# Patient Record
Sex: MALE | Race: WHITE | Employment: OTHER | URBAN - METROPOLITAN AREA
[De-identification: names, ages, dates, MRNs, and addresses within clinical notes are randomized per-mention and may not be internally consistent; named-entity substitution may affect disease eponyms.]

---

## 2018-03-26 ENCOUNTER — OFFICE VISIT (OUTPATIENT)
Dept: FAMILY MEDICINE | Facility: CLINIC | Age: 53
End: 2018-03-26
Payer: COMMERCIAL

## 2018-03-26 VITALS
OXYGEN SATURATION: 97 % | HEIGHT: 68 IN | DIASTOLIC BLOOD PRESSURE: 60 MMHG | BODY MASS INDEX: 32.19 KG/M2 | HEART RATE: 88 BPM | SYSTOLIC BLOOD PRESSURE: 110 MMHG | TEMPERATURE: 97.2 F | WEIGHT: 212.38 LBS | RESPIRATION RATE: 12 BRPM

## 2018-03-26 DIAGNOSIS — Z11.59 NEED FOR HEPATITIS C SCREENING TEST: ICD-10-CM

## 2018-03-26 DIAGNOSIS — N45.1 EPIDIDYMITIS: Primary | ICD-10-CM

## 2018-03-26 DIAGNOSIS — R61 GENERALIZED HYPERHIDROSIS: ICD-10-CM

## 2018-03-26 LAB — TSH SERPL DL<=0.005 MIU/L-ACNC: 2.33 MU/L (ref 0.4–4)

## 2018-03-26 PROCEDURE — 86803 HEPATITIS C AB TEST: CPT | Performed by: FAMILY MEDICINE

## 2018-03-26 PROCEDURE — 36415 COLL VENOUS BLD VENIPUNCTURE: CPT | Performed by: FAMILY MEDICINE

## 2018-03-26 PROCEDURE — 84443 ASSAY THYROID STIM HORMONE: CPT | Performed by: FAMILY MEDICINE

## 2018-03-26 PROCEDURE — 99213 OFFICE O/P EST LOW 20 MIN: CPT | Performed by: FAMILY MEDICINE

## 2018-03-26 RX ORDER — LEVOFLOXACIN 500 MG/1
500 TABLET, FILM COATED ORAL DAILY
Qty: 10 TABLET | Refills: 0 | Status: SHIPPED | OUTPATIENT
Start: 2018-03-26 | End: 2018-04-10

## 2018-03-26 ASSESSMENT — PAIN SCALES - GENERAL: PAINLEVEL: MILD PAIN (2)

## 2018-03-26 NOTE — NURSING NOTE
"Chief Complaint   Patient presents with     Testicular/scrotal Pain     left testicle x6w. No injury. Worse after sex. Notices more pain when working out.      Sweats     x6m he states he is overheated most of the time. He hasnt worn a coat in 2 mos and uses no blankets at night. He sweats alot and feels warm all the time.        Initial /60  Pulse 88  Temp 97.2  F (36.2  C) (Tympanic)  Resp 12  Ht 5' 7.5\" (1.715 m)  Wt 212 lb 6 oz (96.3 kg)  SpO2 97%  BMI 32.77 kg/m2 Estimated body mass index is 32.77 kg/(m^2) as calculated from the following:    Height as of this encounter: 5' 7.5\" (1.715 m).    Weight as of this encounter: 212 lb 6 oz (96.3 kg).  Medication Reconciliation: complete   Health Maintenance Due   Topic Date Due     HEPATITIS C SCREENING  06/23/1983     Moni Nuno, [facility]      "

## 2018-03-26 NOTE — PROGRESS NOTES
Subjective:  Vision is here today with complaints of 3 weeks pain in his left testicle.  He denies any injury.  States that he just is getting very uncomfortable and there may be some swelling around the testicle.  Is not noticed any mass.  No other complaints at this time other than he has had some sweats in relationship to this.    Objective:  Gentle exam: Patient does have some swelling in the epididymis on the left testicle.  Testicle may be slightly swollen there is no mass appreciated.  No evidence of inguinal hernia on the left but there is tenderness of the cord structures.  No evidence of mass in the right testicle no swelling in the epididymis no evidence of right inguinal hernia.    Assessment:  Epididymitis    Plan:  Levofloxacin 500 mg daily for 7 days.  Can take ibuprofen for the discomfort.  It has no improvement over the next few days she will contact me I did order a TSH segment of the fact he states he has been having some instability and sweats over the past month but it also is a associated with the epididymitis he has not had any night sweats no weight loss.       Yon Miller MD

## 2018-03-26 NOTE — LETTER
March 29, 2018      Raffaele Rocha  [address] [address] [place] [id number]        Dear ,    We are writing to inform you of your test results.    Your test results fall within the expected range(s) or remain unchanged from previous results.  Please continue with current treatment plan.    Resulted Orders   Hepatitis C Screen Reflex to HCV RNA Quant and Genotype   Result Value Ref Range    Hepatitis C Antibody Nonreactive NR^Nonreactive      Comment:      Assay performance characteristics have not been established for newborns,   infants, and children     TSH with free T4 reflex   Result Value Ref Range    TSH 2.33 0.40 - 4.00 mU/L       If you have any questions or concerns, please call the clinic at the number listed above.       Sincerely,        Yon Miller MD, MD

## 2018-03-26 NOTE — MR AVS SNAPSHOT
"              After Visit Summary   3/26/2018    Raffaele Rocha    MRN: [id number]           Patient Information     Date Of Birth          1965        Visit Information        Provider Department      3/26/2018 5:50 PM Yon Miller MD [facility]        Today's Diagnoses     Epididymitis    -  1    Need for hepatitis C screening test        Generalized hyperhidrosis           Follow-ups after your visit        Who to contact     If you have questions or need follow up information about today's clinic visit or your schedule please contact [facility] directly at [phone].  Normal or non-critical lab and imaging results will be communicated to you by Satori Brandshart, letter or phone within 4 business days after the clinic has received the results. If you do not hear from us within 7 days, please contact the clinic through Satori Brandshart or phone. If you have a critical or abnormal lab result, we will notify you by phone as soon as possible.  Submit refill requests through [employer] or call your pharmacy and they will forward the refill request to us. Please allow 3 business days for your refill to be completed.          Additional Information About Your Visit        MyChart Information     [facility] lets you send messages to your doctor, view your test results, renew your prescriptions, schedule appointments and more. To sign up, go to [email or web address] . Click on \"Log in\" on the left side of the screen, which will take you to the Welcome page. Then click on \"Sign up Now\" on the right side of the page.     You will be asked to enter the access code listed below, as well as some personal information. Please follow the directions to create your username and password.     Your access code is: [email or web address]  Expires: 2018  6:32 PM     Your access code will  in 90 days. If you need help or a new code, please call your [facility] or [phone].        Care EveryWhere ID     " "This is your Care EveryWhere ID. This could be used by other organizations to access your [facility] medical records  [id number]        Your Vitals Were     Pulse Temperature Respirations Height Pulse Oximetry BMI (Body Mass Index)    88 97.2  F (36.2  C) (Tympanic) 12 5' 7.5\" (1.715 m) 97% 32.77 kg/m2       Blood Pressure from Last 3 Encounters:   03/26/18 110/60   12/16/16 118/58   03/25/16 123/81    Weight from Last 3 Encounters:   03/26/18 212 lb 6 oz (96.3 kg)   12/16/16 209 lb 6.4 oz (95 kg)   03/25/16 200 lb (90.7 kg)              We Performed the Following     Hepatitis C Screen Reflex to HCV RNA Quant and Genotype     TSH with free T4 reflex          Today's Medication Changes          These changes are accurate as of 3/26/18  6:32 PM.  If you have any questions, ask your nurse or doctor.               Start taking these medicines.        Dose/Directions    levofloxacin 500 MG tablet   Commonly known as:  LEVAQUIN   Used for:  Epididymitis   Started by:  Yon Miller MD        Dose:  500 mg   Take 1 tablet (500 mg) by mouth daily   Quantity:  10 tablet   Refills:  0            Where to get your medicines      These medications [facility] to [facility] [place] [address] [facility]   [facility] [address]     Phone:  [phone]     levofloxacin 500 MG tablet                Primary Care Provider Office Phone # Fax #    Yon Miller [phone]       [address] [place] [address]   [facility] [address]        Equal Access to Services     [facility]: Hadii chica ku hadasho Soomaali, waaxda luqadaha, qaybta kaalmada adeegyabethany, haroldo idiangela powell. So [facility] [phone].    ATENCIÓN: Si habla español, tiene a selby disposición servicios gratuitos de asistencia lingüística. Llame al [phone].    We comply with applicable federal civil rights laws and [place] laws. We do not discriminate on the basis of race, color, national origin, age, " disability, sex, sexual orientation, or gender identity.            Thank you!     Thank you for choosing [facility]  for your care. Our goal is always to provide you with excellent care. Hearing back from our patients is one way we can continue to improve our services. Please take a few minutes to complete the written survey that you may receive in the mail after your visit with us. Thank you!             Your Updated Medication List - Protect others around you: Learn how to safely use, store and throw away your medicines at www.disposemymeds.org.          This list is accurate as of 3/26/18  6:32 PM.  Always use your most recent med list.                   Brand Name Dispense Instructions for use Diagnosis    levofloxacin 500 MG tablet    LEVAQUIN    10 tablet    Take 1 tablet (500 mg) by mouth daily    Epididymitis       order for DME      Resmed Airsense 10 auto cpap 5-10 cm, Wisp nasal cushion large

## 2018-03-27 LAB — HCV AB SERPL QL IA: NONREACTIVE

## 2018-04-10 DIAGNOSIS — N45.1 EPIDIDYMITIS: ICD-10-CM

## 2018-04-10 RX ORDER — LEVOFLOXACIN 500 MG/1
500 TABLET, FILM COATED ORAL DAILY
Qty: 10 TABLET | Refills: 0 | Status: SHIPPED | OUTPATIENT
Start: 2018-04-10 | End: 2018-06-19

## 2018-06-19 ENCOUNTER — OFFICE VISIT (OUTPATIENT)
Dept: FAMILY MEDICINE | Facility: CLINIC | Age: 53
End: 2018-06-19
Payer: COMMERCIAL

## 2018-06-19 VITALS
SYSTOLIC BLOOD PRESSURE: 128 MMHG | DIASTOLIC BLOOD PRESSURE: 80 MMHG | BODY MASS INDEX: 33.22 KG/M2 | TEMPERATURE: 96.8 F | OXYGEN SATURATION: 97 % | RESPIRATION RATE: 22 BRPM | WEIGHT: 215.3 LBS | HEART RATE: 80 BPM

## 2018-06-19 DIAGNOSIS — J30.89 ENVIRONMENTAL AND SEASONAL ALLERGIES: ICD-10-CM

## 2018-06-19 DIAGNOSIS — J20.9 ACUTE BRONCHITIS WITH SYMPTOMS > 10 DAYS: Primary | ICD-10-CM

## 2018-06-19 PROCEDURE — 99213 OFFICE O/P EST LOW 20 MIN: CPT | Performed by: NURSE PRACTITIONER

## 2018-06-19 RX ORDER — AZITHROMYCIN 250 MG/1
TABLET, FILM COATED ORAL
Qty: 6 TABLET | Refills: 0 | Status: SHIPPED | OUTPATIENT
Start: 2018-06-19 | End: 2020-05-14

## 2018-06-19 RX ORDER — MONTELUKAST SODIUM 10 MG/1
10 TABLET ORAL AT BEDTIME
Qty: 30 TABLET | Refills: 1 | Status: SHIPPED | OUTPATIENT
Start: 2018-06-19 | End: 2020-05-14

## 2018-06-19 RX ORDER — CODEINE PHOSPHATE AND GUAIFENESIN 10; 100 MG/5ML; MG/5ML
2 SOLUTION ORAL EVERY 4 HOURS PRN
Qty: 240 ML | Refills: 0 | Status: SHIPPED | OUTPATIENT
Start: 2018-06-19 | End: 2020-05-14

## 2018-06-19 RX ORDER — ALBUTEROL SULFATE 90 UG/1
2 AEROSOL, METERED RESPIRATORY (INHALATION) EVERY 4 HOURS PRN
Qty: 1 INHALER | Refills: 0 | Status: SHIPPED | OUTPATIENT
Start: 2018-06-19 | End: 2020-05-14

## 2018-06-19 NOTE — PROGRESS NOTES
SUBJECTIVE:   Raffaele Rocha is a 52 year old male who presents to clinic today for the following health issues:      Concern - cough  Onset: 2 weeks    Description:   Tickle cough    Intensity: moderate    Progression of Symptoms:  same    Accompanying Signs & Symptoms:  Hoarse voice    Previous history of similar problem:   none    Precipitating factors:   Worsened by: lying down    Alleviating factors:  Improved by: none    Therapies Tried and outcome: none    The patient is seen in clinic with a persistent spasmodic nonproductive cough.  Interferes with sleep, is only been getting an hour and 1/2-2 hours of sleep at night, then wakes up and has to sit up for a while.  Is unable to fall back asleep afterwards.  He denies sinus congestion or postnasal drainage.  He states in the past he was diagnosed with allergies to bluegrass, red oak trees, and dust.  He is not taking anything for allergies because he does not have any nasal congestion or watery eyes.  He denies history of asthma, he is a non-smoker. He is not running a fever.    Problem list and histories reviewed & adjusted, as indicated.  Additional history: as documented    BP Readings from Last 3 Encounters:   06/19/18 128/80   03/26/18 110/60   12/16/16 118/58    Wt Readings from Last 3 Encounters:   06/19/18 215 lb 4.8 oz (97.7 kg)   03/26/18 212 lb 6 oz (96.3 kg)   12/16/16 209 lb 6.4 oz (95 kg)                    Reviewed and updated as needed this visit by clinical staff       Reviewed and updated as needed this visit by Provider         ROS:  Constitutional, HEENT, cardiovascular, pulmonary, gi and gu systems are negative, except as otherwise noted.    OBJECTIVE:     /80  Pulse 80  Temp 96.8  F (36  C) (Temporal)  Resp 22  Wt 215 lb 4.8 oz (97.7 kg)  SpO2 97%  BMI 33.22 kg/m2  Body mass index is 33.22 kg/(m^2).   GENERAL: healthy, alert and no distress  EYES: Eyes grossly normal to inspection, PERRL and conjunctivae and sclerae  normal  HENT: ear canals and TM's normal, nose and mouth without ulcers or lesions  NECK: no adenopathy, no asymmetry, masses, or scars and thyroid normal to palpation  RESP: lungs clear to auscultation - no rales, rhonchi or wheezes  CV: regular rates and rhythm, normal S1 S2, no S3 or S4 and no murmur, click or rub        ASSESSMENT/PLAN:     Problem List Items Addressed This Visit        Medium    Environmental and seasonal allergies    Relevant Medications    albuterol (PROAIR HFA/PROVENTIL HFA/VENTOLIN HFA) 108 (90 Base) MCG/ACT Inhaler    montelukast (SINGULAIR) 10 MG tablet      Other Visit Diagnoses     Acute bronchitis with symptoms > 10 days    -  Primary    Relevant Medications    azithromycin (ZITHROMAX) 250 MG tablet    guaiFENesin-codeine (ROBITUSSIN AC) 100-10 MG/5ML SOLN solution    albuterol (PROAIR HFA/PROVENTIL HFA/VENTOLIN HFA) 108 (90 Base) MCG/ACT Inhaler           I suspect his persistent cough may be more allergy related, but will cover any potential bacterial component with a Z-Shiva, since it has been going on for greater than 2 weeks.  Will start Singulair 10 mg at bedtime.  Since it has an indication for asthma management as well as allergies, this might work well for his current symptoms  Albuterol inhaler 2 puffs every 4-6 hours as needed for spasmodic cough.  Robitussin with codeine for cough at bedtime  Follow-up in clinic if symptoms fail to resolve        ALEKSANDRA Brock [facility]

## 2018-06-19 NOTE — MR AVS SNAPSHOT
"              After Visit Summary   6/19/2018    Raffaele Rocha    MRN: [id number]           Patient Information     Date Of Birth          1965        Visit Information        Provider Department      6/19/2018 5:30 PM Giselle Covarrubias APRN CNP [facility]        Today's Diagnoses     Acute bronchitis with symptoms > 10 days    -  1    Environmental and seasonal allergies           Follow-ups after your visit        Your next 10 appointments already scheduled     Jun 19, 2018  5:30 PM CDT   SHORT with ALEKSANDRA Brock CNP   [facility]    [address] [place] [id number] [phone]              Who to contact     If you have questions or need follow up information about today's clinic visit or your schedule please contact [facility] directly at [phone].  Normal or non-critical lab and imaging results will be communicated to you by MyChart, letter or phone within 4 business days after the clinic has received the results. If you do not hear from us within 7 days, please contact the clinic through MyChart or phone. If you have a critical or abnormal lab result, we will notify you by phone as soon as possible.  Submit refill requests through [employer] or call your pharmacy and they will forward the refill request to us. Please allow 3 business days for your refill to be completed.          Additional Information About Your Visit        MyChart Information     E[employer]io lets you send messages to your doctor, view your test results, renew your prescriptions, schedule appointments and more. To sign up, go to [email or web address] . Click on \"Log in\" on the left side of the screen, which will take you to the Welcome page. Then click on \"Sign up Now\" on the right side of the page.     You will be asked to enter the access code listed below, as well as some personal information. Please follow the directions to create " your username and password.     Your access code is: [email or web address]  Expires: 2018  6:32 PM     Your access code will  in 90 days. If you need help or a new code, please call your [facility] or [phone].        Care EveryWhere ID     This is your Care EveryWhere ID. This could be used by other organizations to access your [facility] medical records  [id number]        Your Vitals Were     Pulse Temperature Respirations Pulse Oximetry BMI (Body Mass Index)       80 96.8  F (36  C) (Temporal) 22 97% 33.22 kg/m2        Blood Pressure from Last 3 Encounters:   18 128/80   18 110/60   16 118/58    Weight from Last 3 Encounters:   18 215 lb 4.8 oz (97.7 kg)   18 212 lb 6 oz (96.3 kg)   16 209 lb 6.4 oz (95 kg)              Today, you had the following     No orders found for display         Today's Medication Changes          These changes are accurate as of 18  5:25 PM.  If you have any questions, ask your nurse or doctor.               Start taking these medicines.        Dose/Directions    albuterol 108 (90 Base) MCG/ACT Inhaler   Commonly known as:  PROAIR HFA/PROVENTIL HFA/VENTOLIN HFA   Used for:  Acute bronchitis with symptoms > 10 days   Started by:  Giselle Covarrubias APRN CNP        Dose:  2 puff   Inhale 2 puffs into the lungs every 4 hours as needed for shortness of breath / dyspnea or wheezing   Quantity:  1 Inhaler   Refills:  0       azithromycin 250 MG tablet   Commonly known as:  ZITHROMAX   Used for:  Acute bronchitis with symptoms > 10 days   Started by:  Giselle Covarrubias APRN CNP        Two tablets first day, then one tablet daily for four days.   Quantity:  6 tablet   Refills:  0       guaiFENesin-codeine 100-10 MG/5ML Soln solution   Commonly known as:  ROBITUSSIN AC   Used for:  Acute bronchitis with symptoms > 10 days   Started by:  Giselle Covarrubias APRN CNP        Dose:  2 tsp.   Take 10 mLs by mouth every 4 hours as needed    Quantity:  240 mL   Refills:  0       montelukast 10 MG tablet   Commonly known as:  SINGULAIR   Used for:  Environmental and seasonal allergies   Started by:  Giselle Covarrubias APRN CNP        Dose:  10 mg   Take 1 tablet (10 mg) by mouth At Bedtime   Quantity:  30 tablet   Refills:  1            Where to get your medicines      These medications [facility] [place] [facility] [facility]   [address] [facility] [address]     Phone:  [phone]     azithromycin 250 MG tablet    montelukast 10 MG tablet         Some of these will need a paper prescription and others can be bought over the counter.  Ask your nurse if you have questions.     Bring a paper prescription for each of these medications     albuterol 108 (90 Base) MCG/ACT Inhaler    guaiFENesin-codeine 100-10 MG/5ML Soln solution                Primary Care Provider Office Phone # Fax #    Yon Miller [phone] [address] [facility] [address] [facility]   [facility] [address]        Equal Access to Services     [facility]: Hadii aad ku hadasho Soomaali, waaxda luqadaha, qaybta kaalmada adeegyada, waxay idiin haythaddeusn andrzej lou . So [facility] [phone].    ATENCIÓN: Si habla español, tiene a selby disposición servicios gratuitos de asistencia lingüística. [facility] [phone].    We comply with applicable federal civil rights laws and [place] laws. We do not discriminate on the basis of race, color, national origin, age, disability, sex, sexual orientation, or gender identity.            Thank you!     Thank you for choosing [facility]  for your care. Our goal is always to provide you with excellent care. Hearing back from our patients is one way we can continue to improve our services. Please take a few minutes to complete the written survey that you may receive in the mail after your visit with us. Thank you!             Your Updated Medication List - Protect others around you: Learn  how to safely use, store and throw away your medicines at www.disposemymeds.org.          This list is accurate as of 6/19/18  5:25 PM.  Always use your most recent med list.                   Brand Name Dispense Instructions for use Diagnosis    albuterol 108 (90 Base) MCG/ACT Inhaler    PROAIR HFA/PROVENTIL HFA/VENTOLIN HFA    1 Inhaler    Inhale 2 puffs into the lungs every 4 hours as needed for shortness of breath / dyspnea or wheezing    Acute bronchitis with symptoms > 10 days       azithromycin 250 MG tablet    ZITHROMAX    6 tablet    Two tablets first day, then one tablet daily for four days.    Acute bronchitis with symptoms > 10 days       guaiFENesin-codeine 100-10 MG/5ML Soln solution    ROBITUSSIN AC    240 mL    Take 10 mLs by mouth every 4 hours as needed    Acute bronchitis with symptoms > 10 days       montelukast 10 MG tablet    SINGULAIR    30 tablet    Take 1 tablet (10 mg) by mouth At Bedtime    Environmental and seasonal allergies

## 2020-05-14 ENCOUNTER — VIRTUAL VISIT (OUTPATIENT)
Dept: INTERNAL MEDICINE | Facility: CLINIC | Age: 55
End: 2020-05-14
Payer: COMMERCIAL

## 2020-05-14 DIAGNOSIS — N35.919 STRICTURE OF MALE URETHRA, UNSPECIFIED STRICTURE TYPE: Primary | ICD-10-CM

## 2020-05-14 PROCEDURE — 99213 OFFICE O/P EST LOW 20 MIN: CPT | Mod: GT | Performed by: INTERNAL MEDICINE

## 2020-05-14 NOTE — PROGRESS NOTES
"Raffaele Rocha is a 54 year old male who is being evaluated via a billable video visit.      The patient has been notified of following:     \"This video visit will be conducted via a call between you and your physician/provider. We have found that certain health care needs can be provided without the need for an in-person physical exam.  This service lets us provide the care you need with a video conversation.  If a prescription is necessary we can send it directly to your pharmacy.  If lab work is needed we can place an order for that and you can then stop by our lab to have the test done at a later time.    Video visits are billed at different rates depending on your insurance coverage.  Please reach out to your insurance provider with any questions.    If during the course of the call the physician/provider feels a video visit is not appropriate, you will not be charged for this service.\"    Patient has given verbal consent for Video visit? Yes    How would you like to obtain your AVS?     Patient would like the video invitation sent by: Text to cell phone: [phone]    Will anyone else be joining your video visit? No    Subjective     Raffaele Rocha is a 54 year old male who presents today via video visit for the following health issues:    HPI  Chief Complaint   Patient presents with     Video Visit     slow urine stream, going on since last summer - was given medication(? tamsulosin) but seemed that it did not work and sx's are getting worse       Video Start Time: 1:41 PM    Started last year, having a BM and felt a stinging his penis.  Had some blood in the urine for a day, wonders if he tore something in the urethra.  He thinks it maybe a stricture or closing there.  Taking 5 minutes to empty his bladder, really slow.        Patient Active Problem List   Diagnosis     Varicose veins     CARDIOVASCULAR SCREENING; LDL GOAL LESS THAN 160     Impacted cerumen     Sleep disorder     Environmental and seasonal " "allergies     Past Surgical History:   Procedure Laterality Date     C APPENDECTOMY  1974     C NONSPECIFIC PROCEDURE  1985    Bunion removal     C STOMACH SURGERY PROCEDURE UNLISTED      appe 75'     COLONOSCOPY N/A 12/16/2015    Procedure: COLONOSCOPY;  Surgeon: Andreas Alvarado MD;  Location: [facility]     HC VASECTOMY UNILAT/BILAT W POSTOP SEMEN  1999    Vasectomy       Social History     Tobacco Use     Smoking status: Never Smoker     Smokeless tobacco: Never Used   Substance Use Topics     Alcohol use: Yes     Comment: rare     Family History   Problem Relation Age of Onset     Heart Disease Paternal Grandmother         CHF     Cancer Maternal Grandmother         breast cancer     Cancer Maternal Grandfather         lip cancer         No current outpatient medications on file.     Allergies   Allergen Reactions     No Known Allergies      No Known Drug Allergies        Reviewed and updated as needed this visit by Provider         Review of Systems   CONSTITUTIONAL:no fevers  RESP:NEGATIVE for significant cough or SOB  CV: NEGATIVE for chest pain, palpitations or peripheral edema  : positive for, decreased urinary stream and dribbling       Objective    There were no vitals taken for this visit.  Estimated body mass index is 33.22 kg/m  as calculated from the following:    Height as of 3/26/18: 1.715 m (5' 7.5\").    Weight as of 6/19/18: 97.7 kg (215 lb 4.8 oz).  Physical Exam     GENERAL: Healthy, alert and no distress  EYES: Eyes grossly normal to inspection.  No discharge or erythema, or obvious scleral/conjunctival abnormalities.  RESP: No audible wheeze, cough, or visible cyanosis.  No visible retractions or increased work of breathing.    SKIN: Visible skin clear. No significant rash, abnormal pigmentation or lesions.  NEURO: Cranial nerves grossly intact.  Mentation and speech appropriate for age.  PSYCH: Mentation appears normal, affect normal/bright, judgement and insight intact, normal speech and " appearance well-groomed.      Diagnostic Test Results:  Labs reviewed in Epic        Assessment & Plan       ICD-10-CM    1. 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  Patient did not get better with Flomax, this is progressively getting worse.  He thinks this is a mechanical issue.  I agree with him that is a mechanical issue when he needs urology for a cystoscopy or dilatation of the urethra.  Urology referral is made today.    CONSULTATION/REFERRAL to Urology    No follow-ups on file.    Dong Serrato MD  [facility]      Video-Visit Details    Type of service:  Video Visit    Video End Time:11:50    Originating Location (pt. Location): Home    Distant Location (provider location):  [facility]     Platform used for Video Visit: Doximity    No follow-ups on file.       Dong Serrato MD

## 2020-05-15 DIAGNOSIS — R31.9 HEMATURIA: Primary | ICD-10-CM

## 2020-05-26 ENCOUNTER — HOSPITAL ENCOUNTER (OUTPATIENT)
Dept: CT IMAGING | Facility: CLINIC | Age: 55
Discharge: HOME | End: 2020-05-26
Attending: UROLOGY | Admitting: UROLOGY
Payer: COMMERCIAL

## 2020-05-26 DIAGNOSIS — R31.9 HEMATURIA: ICD-10-CM

## 2020-05-26 PROCEDURE — 74178 CT ABD&PLV WO CNTR FLWD CNTR: CPT

## 2020-05-26 PROCEDURE — 25000125 ZZHC RX 250: Performed by: UROLOGY

## 2020-05-26 PROCEDURE — 25000128 H RX IP 250 OP 636: Performed by: UROLOGY

## 2020-05-26 RX ORDER — IOPAMIDOL 755 MG/ML
500 INJECTION, SOLUTION INTRAVASCULAR ONCE
Status: COMPLETED | OUTPATIENT
Start: 2020-05-26 | End: 2020-05-26

## 2020-05-26 RX ADMIN — SODIUM CHLORIDE 60 ML: 9 INJECTION, SOLUTION INTRAVENOUS at 09:26

## 2020-05-26 RX ADMIN — IOPAMIDOL 100 ML: 755 INJECTION, SOLUTION INTRAVENOUS at 09:26

## 2020-06-01 ENCOUNTER — OFFICE VISIT (OUTPATIENT)
Dept: UROLOGY | Facility: CLINIC | Age: 55
End: 2020-06-01
Payer: COMMERCIAL

## 2020-06-01 DIAGNOSIS — N40.1 BENIGN PROSTATIC HYPERPLASIA WITH WEAK URINARY STREAM: ICD-10-CM

## 2020-06-01 DIAGNOSIS — R39.12 BENIGN PROSTATIC HYPERPLASIA WITH WEAK URINARY STREAM: ICD-10-CM

## 2020-06-01 DIAGNOSIS — R31.0 GROSS HEMATURIA: Primary | ICD-10-CM

## 2020-06-01 PROCEDURE — 52000 CYSTOURETHROSCOPY: CPT | Performed by: UROLOGY

## 2020-06-01 PROCEDURE — 99204 OFFICE O/P NEW MOD 45 MIN: CPT | Mod: 25 | Performed by: UROLOGY

## 2020-06-01 RX ORDER — TAMSULOSIN HYDROCHLORIDE 0.4 MG/1
0.4 CAPSULE ORAL AT BEDTIME
Qty: 30 CAPSULE | Refills: 1 | Status: SHIPPED | OUTPATIENT
Start: 2020-06-01

## 2020-06-01 RX ORDER — CIPROFLOXACIN 500 MG/1
500 TABLET, FILM COATED ORAL ONCE
Status: COMPLETED | OUTPATIENT
Start: 2020-06-01 | End: 2020-06-01

## 2020-06-01 RX ADMIN — CIPROFLOXACIN 500 MG: 500 TABLET, FILM COATED ORAL at 11:04

## 2020-06-01 NOTE — PROGRESS NOTES
Clinic Administered Medication Documentation     Oral Medication Documentation     Patient was given Ciprofloxacin (Cipro). Prior to medication administration, verified patients identity using patient s name and date of birth. Please see MAR and medication order for additional information.      Was entire amount of medication used? Yes  Expiration Date: 06/2021     The following medication was given:     MEDICATION:  Ciprofloxacin   ROUTE: PO  SITE: mouth  DOSE: 500 mg   LOT #: [id number]  : [employer]   EXPIRATION DATE: 06/2021  NDC#: 93478-162-85   Was there drug waste? No        Maylin Aly RN

## 2020-06-01 NOTE — PROGRESS NOTES
Urology Note            S:  Raffaele Rocha is a 54 year old male who was seen in a consultation at the request of Dr. Dong Serrato for hematuria and slow urinary stream.   Patient had gross hematuria for one day last July and has not seen it again.    He has slower urinary stream lately and is concerned about urethral stricture.    He has no flank pain.  He has no history of kidney stone.  He denies any trauma.  Recent UA showed neg rbc/hpf.  Recent CT urogram was normal.  No past medical history on file.  No current outpatient medications on file.     Past Surgical History:   Procedure Laterality Date     C APPENDECTOMY  1974     C NONSPECIFIC PROCEDURE  1985    Bunion removal     C STOMACH SURGERY PROCEDURE UNLISTED      appe 75'     COLONOSCOPY N/A 12/16/2015    Procedure: COLONOSCOPY;  Surgeon: Andreas Alvarado MD;  Location:  GI     HC VASECTOMY UNILAT/BILAT W POSTOP SEMEN  1999    Vasectomy      Social History     Socioeconomic History     Marital status:      Spouse name: Not on file     Number of children: Not on file     Years of education: Not on file     Highest education level: Not on file   Occupational History     Not on file   Social Needs     Financial resource strain: Not on file     Food insecurity     Worry: Not on file     Inability: Not on file     Transportation needs     Medical: Not on file     Non-medical: Not on file   Tobacco Use     Smoking status: Never Smoker     Smokeless tobacco: Never Used   Substance and Sexual Activity     Alcohol use: Yes     Comment: rare     Drug use: No     Sexual activity: Yes     Partners: Female   Lifestyle     Physical activity     Days per week: Not on file     Minutes per session: Not on file     Stress: Not on file   Relationships     Social connections     Talks on phone: Not on file     Gets together: Not on file     Attends Judaism service: Not on file     Active member of club or organization: Not on file     Attends meetings of clubs or  organizations: Not on file     Relationship status: Not on file     Intimate partner violence     Fear of current or ex partner: Not on file     Emotionally abused: Not on file     Physically abused: Not on file     Forced sexual activity: Not on file   Other Topics Concern     Parent/sibling w/ CABG, MI or angioplasty before 65F 55M? Not Asked   Social History Narrative     Not on file     Family History   Problem Relation Age of Onset     Heart Disease Paternal Grandmother         CHF     Cancer Maternal Grandmother         breast cancer     Cancer Maternal Grandfather         lip cancer          REVIEW OF SYSTEMS  =================  C: NEGATIVE for fever, chills, change in weight  I: NEGATIVE for worrisome rashes, moles or lesions  E/M: NEGATIVE for ear, mouth and throat problems  R: NEGATIVE for significant cough or SHORTNESS OF BREATH  CV:  NEGATIVE for chest pain, palpitations or peripheral edema  GI: NEGATIVE for nausea, abdominal pain, heartburn, or change in bowel habits  NEURO: NEGATIVE numbness/weakness  : see HPI  PSYCH: NEGATIVE depression/anxiety  LYmph: no new enlarged lymph nodes  Ortho: no new trauma/movements           O: Exam:There were no vitals taken for this visit.   Constitutional: healthy, alert and no distress  Cardiovascular: negative, PMI normal.   Respiratory: negative, no evidence of respiratory distress  Gastrointestinal: Abdomen soft, non-tender. BS normal. No masses, organomegaly  : penis no discharge. Testis no masses.  No scrotal skin lesion.    Musculoskeletal: extremities normal- no gross deformities noted, gait normal and normal muscle tone  Skin: no suspicious lesions or rashes  Neurologic: Alert and oriented  Musculaskeletal: moving all extremities  Psychiatric: mentation appears normal. and affect normal/bright  Hematologic/Lymphatic/Immunologic: normal ant/post cervical, axillary, supraclavicular and inguinal nodes    S: Raffaele Rocha is a 54 year old male returns for  hematuria.    Patient is draped and prepped.  Flexible cystoscopy placed under direct vision.      The anterior urethra is normal   The prostatic urethra showed bilateral lobe enlargement.     The length is 2cm,  the coaptation is 2 cm.     In the bladder there is trabeculation grade 2.    Assessment/Plan:  (R31.0) Gross hematuria  (primary encounter diagnosis)  Comment:  Neg urological evaluation  Plan: CYSTOURETHROSCOPY (58247), ciprofloxacin         (CIPRO) tablet 500 mg             (N40.1,  R39.12) Benign prostatic hyperplasia with weak urinary stream  Comment:    Plan: trial of flomax           Side effects discussed           Recheck in one month

## 2020-12-02 DIAGNOSIS — Z20.822 COVID-19 RULED OUT: Primary | ICD-10-CM

## 2020-12-03 ENCOUNTER — VIRTUAL VISIT (OUTPATIENT)
Dept: FAMILY MEDICINE | Facility: OTHER | Age: 55
End: 2020-12-03
Payer: COMMERCIAL

## 2020-12-03 DIAGNOSIS — Z20.822 COVID-19 RULED OUT: Primary | ICD-10-CM

## 2020-12-03 PROCEDURE — 99207 PR SC NO CHARGE VISIT/PATIENT NOT SEEN: CPT | Performed by: NURSE PRACTITIONER

## 2020-12-03 NOTE — PROGRESS NOTES
Patient had already had visit with provider.  Test was ordered as asymptomatic instead of symptomatic.  Just wants testing.  Visit cancelled.  Brittany Márquez, CNP

## 2020-12-05 DIAGNOSIS — Z20.822 COVID-19 RULED OUT: ICD-10-CM

## 2020-12-05 PROCEDURE — U0003 INFECTIOUS AGENT DETECTION BY NUCLEIC ACID (DNA OR RNA); SEVERE ACUTE RESPIRATORY SYNDROME CORONAVIRUS 2 (SARS-COV-2) (CORONAVIRUS DISEASE [COVID-19]), AMPLIFIED PROBE TECHNIQUE, MAKING USE OF HIGH THROUGHPUT TECHNOLOGIES AS DESCRIBED BY CMS-2020-01-R: HCPCS | Performed by: NURSE PRACTITIONER

## 2020-12-06 LAB
SARS-COV-2 RNA SPEC QL NAA+PROBE: ABNORMAL
SPECIMEN SOURCE: ABNORMAL

## 2020-12-07 ENCOUNTER — TELEPHONE (OUTPATIENT)
Dept: FAMILY MEDICINE | Facility: CLINIC | Age: 55
End: 2020-12-07

## 2020-12-07 DIAGNOSIS — U07.1 LAB TEST POSITIVE FOR DETECTION OF COVID-19 VIRUS: Primary | ICD-10-CM

## 2020-12-07 NOTE — TELEPHONE ENCOUNTER
Per Dr. Mattson, patient was informed that his covid test is positive. He will need to quarantine for 10 days and 24 hours after fever without medication (Tylenol, ibuprofen, etc.)       Pat Hinds CMA

## 2021-03-13 ENCOUNTER — IMMUNIZATION (OUTPATIENT)
Dept: FAMILY MEDICINE | Facility: CLINIC | Age: 56
End: 2021-03-13
Payer: COMMERCIAL

## 2021-03-13 PROCEDURE — 0011A PR COVID VAC MODERNA 100 MCG/0.5 ML IM: CPT

## 2021-03-13 PROCEDURE — 91301 PR COVID VAC MODERNA 100 MCG/0.5 ML IM: CPT

## 2021-04-10 ENCOUNTER — IMMUNIZATION (OUTPATIENT)
Dept: FAMILY MEDICINE | Facility: CLINIC | Age: 56
End: 2021-04-10
Attending: FAMILY MEDICINE
Payer: COMMERCIAL

## 2021-04-10 PROCEDURE — 91301 PR COVID VAC MODERNA 100 MCG/0.5 ML IM: CPT

## 2021-04-10 PROCEDURE — 0012A PR COVID VAC MODERNA 100 MCG/0.5 ML IM: CPT

## 2022-02-12 ENCOUNTER — HEALTH MAINTENANCE LETTER (OUTPATIENT)
Age: 57
End: 2022-02-12

## 2023-10-28 ENCOUNTER — HEALTH MAINTENANCE LETTER (OUTPATIENT)
Age: 58
End: 2023-10-28